# Patient Record
Sex: FEMALE | Race: WHITE | Employment: OTHER | URBAN - METROPOLITAN AREA
[De-identification: names, ages, dates, MRNs, and addresses within clinical notes are randomized per-mention and may not be internally consistent; named-entity substitution may affect disease eponyms.]

---

## 2017-11-01 ENCOUNTER — OFFICE VISIT (OUTPATIENT)
Dept: FAMILY MEDICINE CLINIC | Age: 67
End: 2017-11-01

## 2017-11-01 VITALS
BODY MASS INDEX: 31.83 KG/M2 | RESPIRATION RATE: 16 BRPM | WEIGHT: 173 LBS | OXYGEN SATURATION: 99 % | HEIGHT: 62 IN | SYSTOLIC BLOOD PRESSURE: 176 MMHG | TEMPERATURE: 97.3 F | HEART RATE: 75 BPM | DIASTOLIC BLOOD PRESSURE: 97 MMHG

## 2017-11-01 DIAGNOSIS — Z28.21 INFLUENZA VACCINE REFUSED: ICD-10-CM

## 2017-11-01 DIAGNOSIS — L91.8 CUTANEOUS SKIN TAGS: Primary | ICD-10-CM

## 2017-11-01 DIAGNOSIS — R03.0 ELEVATED BLOOD PRESSURE READING: ICD-10-CM

## 2017-11-01 NOTE — PROGRESS NOTES
Subjective:   Serena Herring is an 74 y.o. female with seasonal allergic rhinitis who presents for evaluation of skin tags and possible removal of the skin tags. Skin tags under the right breast for many years. Recently more worrisome due to location - the bra usually irritated  the skin tags and 2 days ago on the skin tags \" was scraped and turned black after\". No pain. No bleeding from the area. No growth of the skin tag. No fever. Allergies - reviewed: Allergies   Allergen Reactions    Amoxicillin Hives    Erythromycin Rash    Pcn [Penicillins] Rash     States has allergy to Amoxicillin     Sulfa (Sulfonamide Antibiotics) Hives         Medications - reviewed:  Current Outpatient Prescriptions   Medication Sig    diphenhydrAMINE (BENADRYL) 25 mg capsule Take 25 mg by mouth every six (6) hours as needed. 12.5 mg     chlorpheniramine-HYDROcodone (TUSSIONEX PENNKINETIC ER) 8-10 mg/5 mL suspension Take 5 mL by mouth every twelve (12) hours as needed for Cough.  albuterol (PROAIR HFA) 90 mcg/actuation inhaler Take 2 puffs by inhalation every four (4) hours as needed for Wheezing. No current facility-administered medications for this visit. Past Medical History - reviewed:  Past Medical History:   Diagnosis Date    Seasonal allergic rhinitis 5/3/2010         Past Surgical History - reviewed:  Past Surgical History:   Procedure Laterality Date    HX TONSILLECTOMY  13yo    HX TUBAL LIGATION           Family History - reviewed:  Family History   Problem Relation Age of Onset    Heart Disease Mother       71yo    Heart Attack Father       52yo    Breast Cancer Maternal Aunt          Social History - reviewed:  Social History     Social History    Marital status:      Spouse name: N/A    Number of children: N/A    Years of education: N/A     Occupational History    Not on file.      Social History Main Topics    Smoking status: Never Smoker    Smokeless tobacco: Not on file    Alcohol use No    Drug use: No    Sexual activity: Not on file     Other Topics Concern    Not on file     Social History Narrative    No narrative on file         Review of Systems   CONSTITUTIONAL: denies fever. Denies chills. MUSCULOSKELETAL: denies joint pain. SKIN: + Skin tags. No rash. Objective:     Visit Vitals    BP (!) 176/97    Pulse 75    Temp 97.3 °F (36.3 °C) (Oral)    Resp 16    Ht 5' 2\" (1.575 m)    Wt 173 lb (78.5 kg)    SpO2 99%    BMI 31.64 kg/m2       General appearance - alert, well appearing, and in no distress  Chest - clear to auscultation, no wheezes, rales or rhonchi, symmetric air entry  Skin - 2 pedunculated lesions( outgrowth) on the narrow stalk  on the right side of the chest under the breast, 1 of the lesions with dark discoloration. ( Photo saved in the media)    No notes on file  [facility]  OFFICE PROCEDURE PROGRESS NOTE        Chart reviewed for the following:   Karn Klinefelter, MD, have reviewed the History, Physical and updated the Allergic reactions for iContact performed immediately prior to start of procedure:   Karn Klinefelter, MD, have performed the following reviews on Carolyn Kwan prior to the start of the procedure:            * Patient was identified by name and date of birth   * Agreement on procedure being performed was verified  * Risks and Benefits explained to the patient  * Procedure site verified and marked as necessary  * Patient was positioned for comfort  * Consent was signed and verified    Skin tags are snipped off  using the liquid nitrogen and sterile iris scissors. Local anesthesia was not not used. Silver nitite sticks were applied to the area to achieve hemostasis. Thin layer of Bacitracin was applied and area was covered with the dressing. These pathognomonic lesions are not sent for pathology.          Time: 12.05 pm      Date of procedure: 11/1/2017    Procedure performed by:  Hanh Hughes MD    Provider assisted by: Te Godoy MD    Patient assisted by: self    How tolerated by patient: tolerated the procedure well with no complications    Post Procedural Pain Scale: 0 - No Hurt    Comments: none      Assessment:   75 yo female who presents for the skin tags removal.       ICD-10-CM ICD-9-CM    1. Cutaneous skin tags L91.8 701.9 REMOVAL OF SKIN TAGS   2. Influenza vaccine refused Z28.21 V64.06    3. Elevated blood pressure reading R03.0 796.2            Plan:   1. Cutaneous skin tags. Removed as above, instructions were provided. .  2. Elevated blood pressure reading. Asymptomatic. The pt left the clinic before the BP was rechecked. Was advised to come in 2 weeks for BP recheck and CPE. The pt refused Influenza vaccine, counseling provided. Follow-up Disposition:  Return in about 2 weeks (around 11/15/2017), or CPE and BP check. I have discussed the diagnosis with the patient and the intended plan as seen in the above orders. The patient has received an after-visit summary and questions were answered concerning future plans. I have discussed medication side effects and warnings with the patient as well. Informed pt to return to the office if new symptoms arise.       Hanh Hughes MD  Family Medicine Resident,PGY-2

## 2017-11-01 NOTE — PATIENT INSTRUCTIONS
Skin Tag Removal: Care Instructions  Your Care Instructions  Skin tags are small lumps of fleshy brown, tan, or pink skin. They are usually raised or hang from the skin on a small stalk. They often grow on the eyelids, neck, armpit, and groin. Skin tags are not moles and usually do not turn into cancer. You are more likely to get skin tags if you are overweight. They also tend to run in families. Skin tags may be removed if they bother you. Your doctor can remove an unwanted skin tag by simply cutting it off. However, new skin tags often form. Follow-up care is a key part of your treatment and safety. Be sure to make and go to all appointments, and call your doctor if you are having problems. It's also a good idea to know your test results and keep a list of the medicines you take. How can you care for yourself at home? · If clothing irritates a skin tag, cover it with a bandage to prevent rubbing and bleeding. · If you have a skin tag removed, clean the area with soap and water two times a day unless your doctor gives you different instructions. Don't use hydrogen peroxide or alcohol, which can slow healing. ¨ You may cover the wound with a thin layer of petroleum jelly, such as Vaseline, and a nonstick bandage. · Check all the skin on your body once a month for skin growths or other changes, such as color and feel of the skin. ¨  front of a full-length mirror. Look carefully at the front and back of your body. Then look at your right and left sides with your arms raised. JESSE [facility] your elbows and look carefully at your forearms, the back of your upper arms, and your palms. ¨ Look at your feet, the soles of your feet, and the spaces between your toes. ¨ Use a hand mirror to look at the back of your legs, the back of your neck, and your back, rear end (buttocks), and genital area. Part the hair on your head to look at your scalp. · If you see a change in a skin growth, contact your doctor.  Look for:  ¨ A mole that bleeds. ¨ A fast-growing mole. ¨ A scaly or crusted growth on the skin. ¨ A sore that will not heal.  When should you call for help? Call your doctor now or seek immediate medical care if:  ? · You have signs of infection such as:  ¨ Pain, warmth, or swelling in your skin. ¨ Red streaks near a wound in your skin. ¨ Pus coming from a wound in your skin. ¨ A fever. ? Watch closely for changes in your health, and be sure to contact your doctor if:  ? · You have an area of normal skin that suddenly changes in shape, size, or how it looks. ? · You do not get better as expected. Where can you learn more? Go to http://nerissa-cholo.info/. Enter [phone] in the search box to learn more about \"Skin Tag Removal: Care Instructions. \"  Current as of: October 13, 2016  Content Version: 11.4  © 4507-[address]. Care instructions adapted under license by [employer] (which disclaims liability or warranty for this information). If you have questions about a medical condition or this instruction, always ask your healthcare professional. [place] [address] any warranty or liability for your use of this information.

## 2017-11-01 NOTE — MR AVS SNAPSHOT
Visit Information Date & Time Provider Department Dept. Phone Encounter #  
 11/1/2017 11:05 AM Taylor Arce MD Alexis Santos [phone] [id number] Follow-up Instructions Return if symptoms worsen or fail to improve. Upcoming Health Maintenance Date Due Hepatitis C Screening 1950 DTaP/Tdap/Td series (1 - Tdap) 9/26/1973 FOBT Q 1 YEAR AGE 50-75 9/26/2002 BREAST CANCER SCRN MAMMOGRAM 5/7/2012 ZOSTER VACCINE AGE 60> 7/26/2012 INFLUENZA AGE 9 TO ADULT 8/1/2017 GLAUCOMA SCREENING Q2Y 9/26/2017 OSTEOPOROSIS SCREENING (DEXA) 9/26/2017 Pneumococcal 65+ Low/Medium Risk (1 of 2 - PCV13) 9/26/2017 MEDICARE YEARLY EXAM 9/26/2017 Allergies as of 11/1/2017  Review Complete On: 11/1/2017 By: Taylor Arce MD  
  
 Severity Noted Reaction Type Reactions Amoxicillin  11/01/2017    Hives Erythromycin  05/03/2010    Rash Pcn [Penicillins]  05/03/2010    Rash States has allergy to Amoxicillin   
 Sulfa (Sulfonamide Antibiotics)  05/03/2010    Hives Current Immunizations  Never Reviewed No immunizations on file. Not reviewed this visit You Were Diagnosed With   
  
 Codes Comments Cutaneous skin tags    -  Primary ICD-10-CM: L91.8 ICD-9-CM: 701.9 Influenza vaccine refused     ICD-10-CM: Z28.21 ICD-9-CM: V64.06 Vitals BP Pulse Temp Resp Height(growth percentile) Weight(growth percentile) (!) 176/97 75 97.3 °F (36.3 °C) (Oral) 16 5' 2\" (1.575 m) 173 lb (78.5 kg) SpO2 BMI OB Status 99% 31.64 kg/m2 Postmenopausal     
  
BMI and BSA Data Body Mass Index Body Surface Area  
 31.64 kg/m 2 1.85 m 2 Preferred Pharmacy Pharmacy Name Phone [facility] [address] [place] [address] 79 [phone] Your Updated Medication List  
  
   
This list is accurate as of: 11/1/17 12:00 PM.  Always use your most recent med list.  
  
  
  
 albuterol 90 mcg/actuation inhaler Commonly known as:  PROAIR HFA Take 2 puffs by inhalation every four (4) hours as needed for Wheezing. BENADRYL 25 mg capsule Generic drug:  diphenhydrAMINE Take 25 mg by mouth every six (6) hours as needed. 12.5 mg  
  
 chlorpheniramine-HYDROcodone 10-8 mg/5 mL suspension Commonly known as:  Vaishali Godinez ER Take 5 mL by mouth every twelve (12) hours as needed for Cough. We Performed the Following REMOVAL OF SKIN TAGS [85806 CPT(R)] Follow-up Instructions Return if symptoms worsen or fail to improve. Patient Instructions Skin Tag Removal: Care Instructions Your Care Instructions Skin tags are small lumps of fleshy brown, tan, or pink skin. They are usually raised or hang from the skin on a small stalk. They often grow on the eyelids, neck, armpit, and groin. Skin tags are not moles and usually do not turn into cancer. You are more likely to get skin tags if you are overweight. They also tend to run in families. Skin tags may be removed if they bother you. Your doctor can remove an unwanted skin tag by simply cutting it off. However, new skin tags often form. Follow-up care is a key part of your treatment and safety. Be sure to make and go to all appointments, and call your doctor if you are having problems. It's also a good idea to know your test results and keep a list of the medicines you take. How can you care for yourself at home? · If clothing irritates a skin tag, cover it with a bandage to prevent rubbing and bleeding. · If you have a skin tag removed, clean the area with soap and water two times a day unless your doctor gives you different instructions. Don't use hydrogen peroxide or alcohol, which can slow healing. ¨ You may cover the wound with a thin layer of petroleum jelly, such as Vaseline, and a nonstick bandage.  
· Check all the skin on your body once a month for skin growths or other changes, such as color and feel of the skin. ¨  front of a full-length mirror. Look carefully at the front and back of your body. Then look at your right and left sides with your arms raised. JESSE [facility] your elbows and look carefully at your forearms, the back of your upper arms, and your palms. ¨ Look at your feet, the soles of your feet, and the spaces between your toes. ¨ Use a hand mirror to look at the back of your legs, the back of your neck, and your back, rear end (buttocks), and genital area. Part the hair on your head to look at your scalp. · If you see a change in a skin growth, contact your doctor. Look for: ¨ A mole that bleeds. ¨ A fast-growing mole. ¨ A scaly or crusted growth on the skin. ¨ A sore that will not heal. 
When should you call for help? Call your doctor now or seek immediate medical care if: 
? · You have signs of infection such as: 
¨ Pain, warmth, or swelling in your skin. ¨ Red streaks near a wound in your skin. ¨ Pus coming from a wound in your skin. ¨ A fever. ? Watch closely for changes in your health, and be sure to contact your doctor if: 
? · You have an area of normal skin that suddenly changes in shape, size, or how it looks. ? · You do not get better as expected. Where can you learn more? Go to http://nerissa-cholo.info/. Enter ([phone] in the search box to learn more about \"Skin Tag Removal: Care Instructions. \" Current as of: October 13, 2016 Content Version: 11.4 © 5682-0621 [email or web address]. Care instructions adapted under license by [employer] (which disclaims liability or warranty for this information). If you have questions about a medical condition or this instruction, always ask your healthcare professional. [place] [address] any warranty or liability for your use of this information. Introducing [facility]!    
 [employer] introduces [employer] patient portal. Now you can access parts of your medical record, email your doctor's office, and request medication refills online. 1. In your internet browser, go to [email or web address] 2. Click on the First Time User? Click Here link in the Sign In box. You will see the New Member Sign Up page. 3. Enter your Voltaire Access Code exactly as it appears below. You will not need to use this code after youve completed the sign-up process. If you do not sign up before the expiration date, you must request a new code. · Voltaire Access Code: [email or web address] Expires: 1/30/2018 11:06 AM 
 
4. Enter the last four digits of your Social Security Number (xxxx) and Date of Birth (mm/dd/yyyy) as indicated and click Submit. You will be taken to the next sign-up page. 5. Create a Voltaire ID. This will be your Voltaire login ID and cannot be changed, so think of one that is secure and easy to remember. 6. Create a Voltaire password. You can change your password at any time. 7. Enter your Password Reset Question and Answer. This can be used at a later time if you forget your password. 8. Enter your e-mail address. You will receive e-mail notification when new information is available in [address] 9. Click Sign Up. You can now view and download portions of your medical record. 10. Click the Download Summary menu link to download a portable copy of your medical information. If you have questions, please visit the Frequently Asked Questions section of the Voltaire website. Remember, Voltaire is NOT to be used for urgent needs. For medical emergencies, dial 911. Now available from your iPhone and Android! Please provide this summary of care documentation to your next provider. Your primary care clinician is listed as Deborah Blackwood. If you have any questions after today's visit, please call [phone].

## 2017-11-01 NOTE — PROGRESS NOTES
Chief Complaint   Patient presents with    Skin Problem     skin tags under right breast sore and turned black     1. Have you been to the ER, urgent care clinic since your last visit? Hospitalized since your last visit? No    2. Have you seen or consulted any other health care providers outside of the [address] [place] [address] since your last visit? Include any pap smears or colon screening.  No    [facility]  OFFICE PROCEDURE PROGRESS NOTE        Chart reviewed for the following:   Leticia SHAIKH LPN, have reviewed the History, Physical and updated the Allergic reactions for [id number] Luchadores performed immediately prior to start of procedure:   Tisha Howe LPN, have performed the following reviews on Anni Patel prior to the start of the procedure:            * Patient was identified by name and date of birth   * Agreement on procedure being performed was verified  * Risks and Benefits explained to the patient  * Procedure site verified and marked as necessary  * Patient was positioned for comfort  * Consent was signed and verified     Time: 11:40am      Date of procedure: 11/1/2017    Procedure performed by:  Louise Allen MD    Provider assisted by: Dr. Elroy Hartman     Patient assisted by: nursing attendant    How tolerated by patient: tolerated the procedure well with no complications    Post Procedural Pain Scale: 0 - No Hurt    Comments: none

## 2017-11-26 NOTE — PROGRESS NOTES
I saw and evaluated the patient, performing the key elements of the service. I discussed the findings, assessment and plan with the resident and agree with the resident's findings and plan as documented in the resident's note. I personally supervised the procedure. No complications. Bleeding well controlled with silver nitrate.

## 2018-07-12 ENCOUNTER — OFFICE VISIT (OUTPATIENT)
Dept: FAMILY MEDICINE CLINIC | Age: 68
End: 2018-07-12

## 2018-07-12 ENCOUNTER — HOSPITAL ENCOUNTER (OUTPATIENT)
Dept: LAB | Age: 68
Discharge: HOME | End: 2018-07-12
Payer: MEDICARE

## 2018-07-12 VITALS
RESPIRATION RATE: 16 BRPM | HEART RATE: 57 BPM | BODY MASS INDEX: 30.29 KG/M2 | TEMPERATURE: 97.6 F | DIASTOLIC BLOOD PRESSURE: 71 MMHG | SYSTOLIC BLOOD PRESSURE: 125 MMHG | HEIGHT: 62 IN | OXYGEN SATURATION: 100 % | WEIGHT: 164.6 LBS

## 2018-07-12 DIAGNOSIS — Z11.59 NEED FOR HEPATITIS C SCREENING TEST: ICD-10-CM

## 2018-07-12 DIAGNOSIS — Z00.00 WELCOME TO MEDICARE PREVENTIVE VISIT: Primary | ICD-10-CM

## 2018-07-12 DIAGNOSIS — Z12.11 SCREEN FOR COLON CANCER: ICD-10-CM

## 2018-07-12 DIAGNOSIS — Z13.39 SCREENING FOR ALCOHOLISM: ICD-10-CM

## 2018-07-12 DIAGNOSIS — Z13.1 SCREENING FOR DIABETES MELLITUS: ICD-10-CM

## 2018-07-12 DIAGNOSIS — Z13.820 OSTEOPOROSIS SCREENING: ICD-10-CM

## 2018-07-12 DIAGNOSIS — Z13.31 SCREENING FOR DEPRESSION: ICD-10-CM

## 2018-07-12 DIAGNOSIS — Z23 ENCOUNTER FOR IMMUNIZATION: ICD-10-CM

## 2018-07-12 DIAGNOSIS — Z12.31 ENCOUNTER FOR SCREENING MAMMOGRAM FOR MALIGNANT NEOPLASM OF BREAST: ICD-10-CM

## 2018-07-12 PROCEDURE — 36415 COLL VENOUS BLD VENIPUNCTURE: CPT

## 2018-07-12 PROCEDURE — 86803 HEPATITIS C AB TEST: CPT

## 2018-07-12 PROCEDURE — 80061 LIPID PANEL: CPT

## 2018-07-12 PROCEDURE — 85027 COMPLETE CBC AUTOMATED: CPT

## 2018-07-12 PROCEDURE — 80053 COMPREHEN METABOLIC PANEL: CPT

## 2018-07-12 NOTE — PROGRESS NOTES
Identified Patient with two Patient identifiers (Name and ). Two Patient Identifiers confirmed. Reviewed record in preparation for visit and have obtained necessary documentation. Chief Complaint   Patient presents with    Annual Wellness Visit     Fasting       Visit Vitals    /71 (BP 1 Location: Right arm, BP Patient Position: Sitting)    Pulse (!) 57    Temp 97.6 °F (36.4 °C) (Oral)    Resp 16    Ht 5' 2\" (1.575 m)    Wt 164 lb 9.6 oz (74.7 kg)    SpO2 100%    BMI 30.11 kg/m2       1. Have you been to the ER, urgent care clinic since your last visit? Hospitalized since your last visit? No    2. Have you seen or consulted any other health care providers outside of the [address] [place] [address] since your last visit? Include any pap smears or colon screening. No    PHQ over the last two weeks 2018   Little interest or pleasure in doing things Not at all   Feeling down, depressed or hopeless Not at all   Total Score PHQ 2 0     Fall Risk Assessment, last 12 mths 2018   Able to walk? Yes   Fall in past 12 months? No       General Health Questions   -During the past 4 weeks:   -how would you rate your health in general? Good   -how often have you been bothered by feeling dizzy when standing up? never   -how much have you been bothered by bodily pain? not   -Have you noticed any hearing difficulties? no   -has your physical and emotional health limited your social activities with family or friends? no    Emotional Health Questions   -Do you have a history of depression, anxiety, or emotional problems? no  -Over the past 2 weeks, have you felt down, depressed or hopeless? no  -Over the past 2 weeks, have you felt little interest or pleasure in doing things? no    Health Habits   Please describe your diet habits: Breakfast/Lunch , Only 2 regular meals and 1 light meal   Do you get 5 servings of fruits or vegetables daily? yes  Do you exercise regularly?  yes    Activities of Daily Living and Functional Status   -Do you need help with eating, walking, dressing, bathing, toileting, the phone, transportation, shopping, preparing meals, housework, laundry, medications or managing money? no  -In the past four weeks, was someone available to help you if you needed and wanted help with anything? yes  -Are you confident are you that you can control and manage most of your health problems? yes  -Have you been given information to help you keep track of your medications? N/A Not Currently taking any  medications at this time  -How often do you have trouble taking your medications as prescribed? N/A    Fall Risk and Home Safety   Have you fallen 2 or more times in the past year? no  Does your home have rugs in the hallway? No  Do you have grab bars in the bathroom? No  Do you have handrails on the stairs? Yes  Do you have adequate lighting? Yes  Do you have smoke detectors and check them regularly?  yes  Do you have difficulties driving a car? no  Do you always fasten your seat belt when you are in a car? yes

## 2018-07-12 NOTE — PATIENT INSTRUCTIONS
Medicare Wellness Visit, Female    The best way to live healthy is to have a lifestyle where you eat a well-balanced diet, exercise regularly, limit alcohol use, and quit all forms of tobacco/nicotine, if applicable. Regular preventive services are another way to keep healthy. Preventive services (vaccines, screening tests, monitoring & exams) can help personalize your care plan, which helps you manage your own care. Screening tests can find health problems at the earliest stages, when they are easiest to treat. [address] [facility] follows the current, evidence-based guidelines published by the [facility] Patrice Zac ([facility]) when recommending preventive services for our patients. Because we follow these guidelines, sometimes recommendations change over time as research supports it. (For example, mammograms used to be recommended annually. Even though Medicare will still pay for an annual mammogram, the newer guidelines recommend a mammogram every two years for women of average risk.)    Of course, you and your provider may decide to screen more often for some diseases, based on your risk and co-morbidities (chronic disease you are already diagnosed with). Preventive services for you include:    - Medicare offers their members a free annual wellness visit, which is time for you and your primary care provider to discuss and plan for your preventive service needs. Take advantage of this benefit every year!    -All people over age 72 should receive the recommended pneumonia vaccines. Current USPSTF guidelines recommend a series of two vaccines for the best pneumonia protection.     -All adults should have a yearly flu vaccine and a tetanus vaccine every 10 years. All adults age 61 years should receive a shingles vaccine once in their lifetime.      -A bone mass density test is recommended when a woman turns 65 to screen for osteoporosis.  This test is only recommended once as a screening. Some providers will use this same test as a disease monitoring tool if you already have osteoporosis. -All adults age 38-68 years who are overweight should have a diabetes screening test once every three years.     -Other screening tests & preventive services for persons with diabetes include: an eye exam to screen for diabetic retinopathy, a kidney function test, a foot exam, and stricter control over your cholesterol.     -Cardiovascular screening for adults with routine risk involves an electrocardiogram (ECG) at intervals determined by the provider.     -Colorectal cancer screenings should be done for adults age 54-65 years with normal risk. There are a number of acceptable methods of screening for this type of cancer. Each test has its own benefits and drawbacks. Discuss with your provider what is most appropriate for you during your annual wellness visit. The different tests include: colonoscopy (considered the best screening method), a fecal occult blood test, a fecal DNA test, and sigmoidoscopy. -Breast cancer screenings are recommended every other year for women of normal risk age 54-69 years.     -Cervical cancer screenings for women over age 72 are only recommended with certain risk factors.     -All adults born between [facility] should be screened once for Hepatitis C.      Here is a list of your current Health Maintenance items (your personalized list of preventive services) with a due date:  Health Maintenance Due   Topic Date Due    Hepatitis C Test  1950    DTaP/Tdap/Td  (1 - Tdap) 09/26/1973    Stool testing for trace blood  09/26/2002    Breast Cancer Screening  05/07/2012    Shingles Vaccine  07/26/2012    Glaucoma Screening   09/26/2017    Bone Mineral Density   09/26/2017    Pneumococcal Vaccine (1 of 2 - PCV13) 09/26/2017    Annual Well Visit  03/14/2018

## 2018-07-12 NOTE — PROGRESS NOTES
This is a \"Welcome to [facility]"  Initial Preventive Physical Examination (IPPE) providing Personalized Prevention Plan Services (Performed in the first 12 months of enrollment)  I have reviewed the patient's medical history in detail and updated the computerized patient record. Mrs. Ya Ortzi is a pleasant older female with no significant PMH who presents to clinic today for her first medicare visit. She has no acute complaints or concerns today. States she has not had a wellness visit since  as she has been healthy throughout that time. Currently, her only medications include a daily children's benadryl for seasonal allergy symptoms. A full review of symptoms is performed ans available in ROS section of this note. History     Past Medical History:   Diagnosis Date    Seasonal allergic rhinitis 5/3/2010      Past Surgical History:   Procedure Laterality Date    HX TONSILLECTOMY  13yo    HX TUBAL LIGATION       Current Outpatient Prescriptions   Medication Sig Dispense Refill    diphenhydrAMINE (BENADRYL) 25 mg capsule Take 25 mg by mouth every six (6) hours as needed. 12.5 mg       chlorpheniramine-HYDROcodone (TUSSIONEX PENNKINETIC ER) 8-10 mg/5 mL suspension Take 5 mL by mouth every twelve (12) hours as needed for Cough. 60 mL 0    albuterol (PROAIR HFA) 90 mcg/actuation inhaler Take 2 puffs by inhalation every four (4) hours as needed for Wheezing.  1 Inhaler 0     Allergies   Allergen Reactions    Amoxicillin Hives    Erythromycin Rash    Pcn [Penicillins] Rash     States has allergy to Amoxicillin     Sulfa (Sulfonamide Antibiotics) Hives     Family History   Problem Relation Age of Onset    Heart Disease Mother       69yo    Heart Attack Father       54yo    Breast Cancer Maternal Aunt      Social History   Substance Use Topics    Smoking status: Never Smoker    Smokeless tobacco: Never Used    Alcohol use No     Diet, Lifestyle: No special diet    Exercise level: moderately active     Review of Systems   Constitutional: Negative for malaise/fatigue. HENT: Negative for hearing loss, sinus pain and sore throat. Eyes: Negative for blurred vision, double vision and photophobia. Respiratory: Negative for cough, shortness of breath and wheezing. Cardiovascular: Negative for chest pain, palpitations, orthopnea, claudication, leg swelling and PND. Gastrointestinal: Negative for blood in stool, constipation, melena, nausea and vomiting. Musculoskeletal: Negative for falls, joint pain and myalgias. Skin: Negative for itching and rash. Neurological: Negative for sensory change, focal weakness, weakness and headaches. Psychiatric/Behavioral: Negative for depression, memory loss and substance abuse. The patient does not have insomnia. Depression Risk Screen     PHQ over the last two weeks 7/12/2018   Little interest or pleasure in doing things Not at all   Feeling down, depressed or hopeless Not at all   Total Score PHQ 2 0     Alcohol Risk Screen   You do not drink alcohol or very rarely. Functional Ability and Level of Safety   Hearing Loss  Hearing is good. Vision Screening  Vision is good. No exam data present      Activities of Daily Living  The home contains: no safety equipment. Patient does total self care    Fall Risk Screen  Fall Risk Assessment, last 12 mths 7/12/2018   Able to walk? Yes   Fall in past 12 months? No       Abuse Screen  Patient is not abused    Screening EKG   EKG order placed: Yes    Patient Care Team   Patient Care Team:  Jeff Barbour MD as PCP - General (Family Practice)     End of Life Planning   Advanced care planning directives were discussed with the patient and /or family/caregiver. She was provided [facility] forms and directions for establishing advanced directives. Physical Exam   Constitutional: She is oriented to person, place, and time. AOX3, NAD   HENT:   Head: Normocephalic and atraumatic.    Right Ear: External ear normal.   Left Ear: External ear normal.   Nose: Nose normal.   Mouth/Throat: Oropharynx is clear and moist. No oropharyngeal exudate. Eyes: Conjunctivae and EOM are normal. Pupils are equal, round, and reactive to light. Right eye exhibits no discharge. Left eye exhibits no discharge. No scleral icterus. Neck: Normal range of motion. Neck supple. No JVD present. No tracheal deviation present. No thyromegaly present. Cardiovascular: Normal rate, regular rhythm, normal heart sounds and intact distal pulses. Exam reveals no gallop and no friction rub. No murmur heard. Pulmonary/Chest: Effort normal and breath sounds normal. No respiratory distress. She has no wheezes. She exhibits no tenderness. Abdominal: Soft. Bowel sounds are normal. She exhibits no distension. There is no tenderness. Musculoskeletal: Normal range of motion. She exhibits no edema, tenderness or deformity. Lymphadenopathy:     She has no cervical adenopathy. Neurological: She is alert and oriented to person, place, and time. She has normal reflexes. No cranial nerve deficit. Gait normal. Coordination normal.   Skin: Skin is warm and dry. No rash noted. No erythema. No pallor. Psychiatric: Affect and judgment normal.     Assessment/Plan   Education and counseling provided:  Are appropriate based on today's review and evaluation  End-of-Life planning (with patient's consent)  Pneumococcal Vaccine  Screening Mammography  Colorectal cancer screening tests  Cardiovascular screening blood test  Bone mass measurement (DEXA)  Screening for glaucoma  Diabetes screening test    Diagnoses and all orders for this visit:    1. Welcome to Medicare preventive visit  -    Immunizations: Discussed need for Tdap, Pneumococcal and Zoster vaccine. Pt declined vaccine. Discussed risks of recurrent disease after 65 in absence of immunization. She expresses understanding and signed refusal form.   -     Pt also with Family Hx of heart disease (father  is 46s from rheumatic heart disease and mother in 76s from atherosclerosis). Cardiac stress test in  unremarkable. No hx of HTN or DM  -     12 Lead EKG in office today with NSR and non-specific T wave changes  -     Will obtain and follow-up CBC, CMP and Lipid Panel    2. Osteoporosis screening  -     Dexa Bone Density Study Axial ([id number]); Future    3. Screening for alcoholism  -     Annual  Alcohol Screen 15 min ()    4. Screening for depression  -     Depression Screen Annual    5. Screening for diabetes mellitus         -     Will follow-up fasting blood glucose levels    6. Need for hepatitis C screening test  -     HEPATITIS C AB    7. Encounter for screening mammogram for malignant neoplasm of breast  -     Bilateral Digital Screening Mammography; Future    8. Screen for colon cancer        -      Discussed importance of screening colonoscopy. Pt averse to colonoscopy, citing close friend who  in the process. Offered FOBT screening but pt declined this as well. Pt discussed with Dr. Madeleine Evangelista; attending Physician.     Health Maintenance Due   Topic Date Due    Hepatitis C Screening  1950    DTaP/Tdap/Td series (1 - Tdap) 1973    FOBT Q 1 YEAR AGE 50-75  2002    BREAST CANCER SCRN MAMMOGRAM  2012    ZOSTER VACCINE AGE 60>  2012    GLAUCOMA SCREENING Q2Y  2017    Bone Densitometry (Dexa) Screening  2017    Pneumococcal 65+ Low/Medium Risk (1 of 2 - PCV13) 2017    MEDICARE YEARLY EXAM  2018

## 2018-07-12 NOTE — MR AVS SNAPSHOT
[address] 
[phone] Patient: Abbi Romo MRN: [id number] MVP:7/30/[birth year]25 Visit Information Date & Time Provider Department Dept. Phone Encounter #  
 7/12/2018  8:30 AM King Carpenter MD [facility] [phone] [id number] Follow-up Instructions Return in about 6 months (around 1/12/2019), or if symptoms worsen or fail to improve. Upcoming Health Maintenance Date Due Hepatitis C Screening 1950 DTaP/Tdap/Td series (1 - Tdap) 9/26/1973 FOBT Q 1 YEAR AGE 50-75 9/26/2002 BREAST CANCER SCRN MAMMOGRAM 5/7/2012 ZOSTER VACCINE AGE 60> 7/26/2012 GLAUCOMA SCREENING Q2Y 9/26/2017 Bone Densitometry (Dexa) Screening 9/26/2017 Pneumococcal 65+ Low/Medium Risk (1 of 2 - PCV13) 9/26/2017 MEDICARE YEARLY EXAM 3/14/2018 Influenza Age 5 to Adult 8/1/2018 Allergies as of 7/12/2018  Review Complete On: 7/12/2018 By: Knig Carpenter MD  
  
 Severity Noted Reaction Type Reactions Amoxicillin  11/01/2017    Hives Erythromycin  05/03/2010    Rash Pcn [Penicillins]  05/03/2010    Rash States has allergy to Amoxicillin   
 Sulfa (Sulfonamide Antibiotics)  05/03/2010    Hives Current Immunizations  Never Reviewed No immunizations on file. Not reviewed this visit You Were Diagnosed With   
  
 Codes Comments Welcome to Medicare preventive visit    -  Primary ICD-10-CM: Z00.00 ICD-9-CM: V70.0 Osteoporosis screening     ICD-10-CM: Z13.820 ICD-9-CM: V82.81 Screening for alcoholism     ICD-10-CM: Z13.89 ICD-9-CM: V79.1 Screening for depression     ICD-10-CM: Z13.89 ICD-9-CM: V79.0 Screening for diabetes mellitus     ICD-10-CM: Z13.1 ICD-9-CM: V77.1 Need for hepatitis C screening test     ICD-10-CM: Z11.59 
ICD-9-CM: V73.89  Encounter for screening mammogram for malignant neoplasm of breast ICD-10-CM: Z12.31 
ICD-9-CM: V76.12 Screen for colon cancer     ICD-10-CM: Z12.11 ICD-9-CM: V76.51 Encounter for immunization     ICD-10-CM: Q49 ICD-9-CM: V03.89 Vitals BP Pulse Temp Resp Height(growth percentile) Weight(growth percentile) 125/71 (BP 1 Location: Right arm, BP Patient Position: Sitting) (!) 57 97.6 °F (36.4 °C) (Oral) 16 5' 2\" (1.575 m) 164 lb 9.6 oz (74.7 kg) SpO2 BMI OB Status Smoking Status 100% 30.11 kg/m2 Postmenopausal Never Smoker Vitals History BMI and BSA Data Body Mass Index Body Surface Area  
 30.11 kg/m 2 1.81 m 2 Preferred Pharmacy Pharmacy Name Phone [address] [address] [phone] Your Updated Medication List  
  
   
This list is accurate as of 7/12/18  9:44 AM.  Always use your most recent med list.  
  
  
  
  
 albuterol 90 mcg/actuation inhaler Commonly known as:  PROAIR HFA Take 2 puffs by inhalation every four (4) hours as needed for Wheezing. BENADRYL 25 mg capsule Generic drug:  diphenhydrAMINE Take 25 mg by mouth every six (6) hours as needed. 12.5 mg  
  
 chlorpheniramine-HYDROcodone 10-8 mg/5 mL suspension Commonly known as:  Nichol Grieves ER Take 5 mL by mouth every twelve (12) hours as needed for Cough. We Performed the Following AMB POC EKG ROUTINE W/ 12 LEADS, INTER & REP [51117 CPT(R)] CBC W/O DIFF [08299 CPT(R)] [address] [[id number] [facility]] HEPATITIS C AB [92936 CPT(R)] LIPID PANEL [16829 CPT(R)] METABOLIC PANEL, COMPREHENSIVE [96859 CPT(R)] [place] ANNUAL ALCOHOL SCREEN 15 MIN [id number] [facility]] Follow-up Instructions Return in about 6 months (around 1/12/2019), or if symptoms worsen or fail to improve. To-Do List   
 07/15/2018 Imaging:  DEXA BONE DENSITY STUDY AXIAL   
  
 07/15/2018   Imaging:  GEO MAMMO BI SCREENING INCL CAD   
  
  
 Patient Instructions Medicare Wellness Visit, Female The best way to live healthy is to have a lifestyle where you eat a well-balanced diet, exercise regularly, limit alcohol use, and quit all forms of tobacco/nicotine, if applicable. Regular preventive services are another way to keep healthy. Preventive services (vaccines, screening tests, monitoring & exams) can help personalize your care plan, which helps you manage your own care. Screening tests can find health problems at the earliest stages, when they are easiest to treat. [address] [facility] follows the current, evidence-based guidelines published by the [facility] Patrice Frank ([facility]) when recommending preventive services for our patients. Because we follow these guidelines, sometimes recommendations change over time as research supports it. (For example, mammograms used to be recommended annually. Even though Medicare will still pay for an annual mammogram, the newer guidelines recommend a mammogram every two years for women of average risk.) Of course, you and your provider may decide to screen more often for some diseases, based on your risk and co-morbidities (chronic disease you are already diagnosed with). Preventive services for you include: - Medicare offers their members a free annual wellness visit, which is time for you and your primary care provider to discuss and plan for your preventive service needs. Take advantage of this benefit every year! 
 
-All people over age 72 should receive the recommended pneumonia vaccines. Current USPSTF guidelines recommend a series of two vaccines for the best pneumonia protection.  
 
-All adults should have a yearly flu vaccine and a tetanus vaccine every 10 years.  All adults age 61 years should receive a shingles vaccine once in their lifetime.   
 
-A bone mass density test is recommended when a woman turns 65 to screen for osteoporosis. This test is only recommended once as a screening. Some providers will use this same test as a disease monitoring tool if you already have osteoporosis. -All adults age 38-68 years who are overweight should have a diabetes screening test once every three years.  
 
-Other screening tests & preventive services for persons with diabetes include: an eye exam to screen for diabetic retinopathy, a kidney function test, a foot exam, and stricter control over your cholesterol.  
 
-Cardiovascular screening for adults with routine risk involves an electrocardiogram (ECG) at intervals determined by the provider.  
 
-Colorectal cancer screenings should be done for adults age 54-65 years with normal risk. There are a number of acceptable methods of screening for this type of cancer. Each test has its own benefits and drawbacks. Discuss with your provider what is most appropriate for you during your annual wellness visit. The different tests include: colonoscopy (considered the best screening method), a fecal occult blood test, a fecal DNA test, and sigmoidoscopy. -Breast cancer screenings are recommended every other year for women of normal risk age 54-69 years.  
 
-Cervical cancer screenings for women over age 72 are only recommended with certain risk factors.  
 
-All adults born between [facility] should be screened once for Hepatitis C. Here is a list of your current Health Maintenance items (your personalized list of preventive services) with a due date: 
Health Maintenance Due Topic Date Due  
 Hepatitis C Test  1950  DTaP/Tdap/Td  (1 - Tdap) 09/26/1973  Stool testing for trace blood  09/26/2002  Breast Cancer Screening  05/07/2012  Shingles Vaccine  07/26/2012  Glaucoma Screening   09/26/2017  Bone Mineral Density   09/26/2017  Pneumococcal Vaccine (1 of 2 - PCV13) 09/26/2017 Mario Reynoso Annual Well Visit  03/14/2018 [facility] [place] [facility]! Alan Barksdale introduces [employer] patient portal. Now you can access parts of your medical record, email your doctor's office, and request medication refills online. 1. In your internet browser, go to [email or web address] 2. Click on the First Time User? Click Here link in the Sign In box. You will see the New Member Sign Up page. 3. Enter your [employer] Access Code exactly as it appears below. You will not need to use this code after youve completed the sign-up process. If you do not sign up before the expiration date, you must request a new code. · [employer] Access Code: [email or web address] Expires: 10/10/2018  8:33 AM 
 
4. Enter the last four digits of your Social Security Number (xxxx) and Date of Birth (mm/dd/yyyy) as indicated and click Submit. You will be taken to the next sign-up page. 5. Create a [employer] ID. This will be your [employer] login ID and cannot be changed, so think of one that is secure and easy to remember. 6. Create a [employer] password. You can change your password at any time. 7. Enter your Password Reset Question and Answer. This can be used at a later time if you forget your password. 8. Enter your e-mail address. You will receive e-mail notification when new information is available in [address] 9. Click Sign Up. You can now view and download portions of your medical record. 10. Click the Download Summary menu link to download a portable copy of your medical information. If you have questions, please visit the Frequently Asked Questions section of the [employer] website. Remember, [employer] is NOT to be used for urgent needs. For medical emergencies, dial 911. Now available from your iPhone and Android! Please provide this summary of care documentation to your next provider. Your primary care clinician is listed as Janneth Woods. If you have any questions after today's visit, please call [phone].

## 2018-07-13 LAB
ALBUMIN SERPL-MCNC: 4.5 G/DL (ref 3.6–4.8)
ALBUMIN/GLOB SERPL: 1.8 {RATIO} (ref 1.2–2.2)
ALP SERPL-CCNC: 66 IU/L (ref 39–117)
ALT SERPL-CCNC: 20 IU/L (ref 0–32)
AST SERPL-CCNC: 21 IU/L (ref 0–40)
BILIRUB SERPL-MCNC: 0.6 MG/DL (ref 0–1.2)
BUN SERPL-MCNC: 13 MG/DL (ref 8–27)
BUN/CREAT SERPL: 16 (ref 12–28)
CALCIUM SERPL-MCNC: 9.8 MG/DL (ref 8.7–10.3)
CHLORIDE SERPL-SCNC: 103 MMOL/L (ref 96–106)
CHOLEST SERPL-MCNC: 205 MG/DL (ref 100–199)
CO2 SERPL-SCNC: 23 MMOL/L (ref 20–29)
CREAT SERPL-MCNC: 0.8 MG/DL (ref 0.57–1)
ERYTHROCYTE [DISTWIDTH] IN BLOOD BY AUTOMATED COUNT: 13.3 % (ref 12.3–15.4)
GLOBULIN SER CALC-MCNC: 2.5 G/DL (ref 1.5–4.5)
GLUCOSE SERPL-MCNC: 105 MG/DL (ref 65–99)
HCT VFR BLD AUTO: 42.2 % (ref 34–46.6)
HCV AB S/CO SERPL IA: <0.1 S/CO RATIO (ref 0–0.9)
HDLC SERPL-MCNC: 74 MG/DL
HGB BLD-MCNC: 14 G/DL (ref 11.1–15.9)
INTERPRETATION, 910389: NORMAL
LDLC SERPL CALC-MCNC: 109 MG/DL (ref 0–99)
MCH RBC QN AUTO: 30.8 PG (ref 26.6–33)
MCHC RBC AUTO-ENTMCNC: 33.2 G/DL (ref 31.5–35.7)
MCV RBC AUTO: 93 FL (ref 79–97)
PLATELET # BLD AUTO: 229 X10E3/UL (ref 150–379)
POTASSIUM SERPL-SCNC: 4.8 MMOL/L (ref 3.5–5.2)
PROT SERPL-MCNC: 7 G/DL (ref 6–8.5)
RBC # BLD AUTO: 4.55 X10E6/UL (ref 3.77–5.28)
SODIUM SERPL-SCNC: 141 MMOL/L (ref 134–144)
TRIGL SERPL-MCNC: 111 MG/DL (ref 0–149)
VLDLC SERPL CALC-MCNC: 22 MG/DL (ref 5–40)
WBC # BLD AUTO: 8.3 X10E3/UL (ref 3.4–10.8)

## 2018-07-20 ENCOUNTER — TELEPHONE (OUTPATIENT)
Dept: FAMILY MEDICINE CLINIC | Age: 68
End: 2018-07-20

## 2018-07-20 NOTE — TELEPHONE ENCOUNTER
Karoline Barth, [phone], [facility] scheduling conceirges. She called to say the dexa scan order triggered due to  [address] To use code MA5.89 or M89.9, either will pass. Please place new order.

## 2018-07-23 ENCOUNTER — HOSPITAL ENCOUNTER (OUTPATIENT)
Dept: MAMMOGRAPHY | Age: 68
Discharge: HOME | End: 2018-07-23
Attending: STUDENT IN AN ORGANIZED HEALTH CARE EDUCATION/TRAINING PROGRAM
Payer: MEDICARE

## 2018-07-23 DIAGNOSIS — Z12.31 ENCOUNTER FOR SCREENING MAMMOGRAM FOR MALIGNANT NEOPLASM OF BREAST: ICD-10-CM

## 2018-07-23 DIAGNOSIS — M85.89 OTHER SPECIFIED DISORDERS OF BONE DENSITY AND STRUCTURE, MULTIPLE SITES: ICD-10-CM

## 2018-07-23 DIAGNOSIS — M85.89 OTHER SPECIFIED DISORDERS OF BONE DENSITY AND STRUCTURE, MULTIPLE SITES: Primary | ICD-10-CM

## 2018-07-23 PROCEDURE — 77080 DXA BONE DENSITY AXIAL: CPT

## 2018-07-23 PROCEDURE — 77067 SCR MAMMO BI INCL CAD: CPT

## 2018-07-23 NOTE — TELEPHONE ENCOUNTER
Catie Betts called for update. Informed her that I would speak with doctor as in the office this morning. He is putting in the new order now. Returned her call and left voice mail of new order done at this time.

## 2018-07-24 DIAGNOSIS — M85.80 OSTEOPENIA, UNSPECIFIED LOCATION: Primary | ICD-10-CM

## 2018-07-24 PROBLEM — M85.89 OSTEOPENIA OF MULTIPLE SITES: Status: ACTIVE | Noted: 2018-07-24

## 2018-07-24 RX ORDER — CALCIUM CARBONATE/VITAMIN D3 250-3.125
1 TABLET ORAL DAILY
Qty: 90 TAB | Refills: 2 | Status: SHIPPED | OUTPATIENT
Start: 2018-07-24

## 2018-07-24 NOTE — PROGRESS NOTES
Pt osteopenic. No indication for Bisphosphonate therapy at this time. Discussed results with Pt. Recommended pt initiate OSCAL (Ca-Vit. D).

## 2022-03-19 PROBLEM — M85.89 OSTEOPENIA OF MULTIPLE SITES: Status: ACTIVE | Noted: 2018-07-24

## 2022-08-30 ENCOUNTER — OFFICE VISIT (OUTPATIENT)
Dept: PODIATRY | Age: 72
End: 2022-08-30

## 2023-03-06 ENCOUNTER — NURSE TRIAGE (OUTPATIENT)
Dept: OTHER | Facility: CLINIC | Age: 73
End: 2023-03-06

## 2023-03-06 NOTE — TELEPHONE ENCOUNTER
Location of patient: [address] lopez from Fiona Leyva at [facility] with [employer]; Patient with Red Flag Complaint requesting to establish care with Michelle Townsend. Current Symptoms: interment right sided facial and ear pressure    Denies - facial droop, facial numbness facial swelling, nasal congestion    Onset: 2 days ago;     Pain Severity: 3/10; when pain occurs but reports no pain at present    Temperature: denies       Recommended disposition: See PCP within 24 Hours\  Patient does not have a PCP and was advised to go to an [address] [place] [address] Ave for current problem. Care advice provided, patient verbalizes understanding; denies any other questions or concerns; instructed to call back for any new or worsening symptoms. Patient/Caller agrees with recommended disposition; writer provided warm transfer to Nallely Schwarz at [facility] for appointment scheduling  For new patient appointment scheduling. Attention Provider: Thank you for allowing me to participate in the care of your patient. The patient was connected to triage in response to information provided to the [facility]. Please do not respond through this encounter as the response is not directed to a shared pool. Reason for Disposition   Face pain present > 24 hours    Protocols used:  Face Pain-ADULT-[place]

## 2023-03-09 ENCOUNTER — OFFICE VISIT (OUTPATIENT)
Dept: FAMILY MEDICINE CLINIC | Age: 73
End: 2023-03-09

## 2023-03-09 VITALS
TEMPERATURE: 98.2 F | SYSTOLIC BLOOD PRESSURE: 146 MMHG | OXYGEN SATURATION: 98 % | HEIGHT: 62 IN | WEIGHT: 182.4 LBS | DIASTOLIC BLOOD PRESSURE: 79 MMHG | HEART RATE: 88 BPM | BODY MASS INDEX: 33.57 KG/M2

## 2023-03-09 DIAGNOSIS — G50.0 RIGHT TRIGEMINAL NEURALGIA: ICD-10-CM

## 2023-03-09 DIAGNOSIS — Z76.89 ESTABLISHING CARE WITH NEW DOCTOR, ENCOUNTER FOR: Primary | ICD-10-CM

## 2023-03-09 DIAGNOSIS — E66.9 OBESITY (BMI 30-39.9): ICD-10-CM

## 2023-03-09 DIAGNOSIS — Z12.31 SCREENING MAMMOGRAM, ENCOUNTER FOR: ICD-10-CM

## 2023-03-09 NOTE — PROGRESS NOTES
Identified pt with two pt identifiers(name and ). Reviewed record in preparation for visit and have obtained necessary documentation. Chief Complaint   Patient presents with    Facial Pain        Health Maintenance Due   Topic    Depression Screen     COVID-19 Vaccine (1)    DTaP/Tdap/Td series (1 - Tdap)    Colorectal Cancer Screening Combo     Shingles Vaccine (1 of 2)    Pneumococcal 65+ years (1 - PCV)    Breast Cancer Screen Mammogram     Flu Vaccine (1)    Medicare Yearly Exam        Visit Vitals  BP (!) 145/82   Pulse 88   Temp 98.2 °F (36.8 °C) (Oral)   Ht 5' 2\" (1.575 m)   Wt 182 lb 6.4 oz (82.7 kg)   SpO2 98%   BMI 33.36 kg/m²         Coordination of Care Questionnaire:  :   1) Have you been to an emergency room, urgent care, or hospitalized since your last visit? If yes, where when, and reason for visit? no       2. Have seen or consulted any other health care provider since your last visit? If yes, where when, and reason for visit? NO        Patient is accompanied by self I have received verbal consent from Cally Leary to discuss any/all medical information while they are present in the room.

## 2023-03-09 NOTE — PROGRESS NOTES
Rosario Gonzales is an 81 y.o. female who presents to [facility] care   Patient was previously receiving care at: [facility]. Last seen greater than x3 years ago  Medical history significant for: seasonal allergies    Patient reports right sided tightness, pressure to the right ear and face intermittent over the last 2-3 days. In the past the patient notes similar symptoms with associated tightness sensation with smiling or feeling like her eyelids are unable to close. She has had a normal ophthalmologic examination recent for these same symptoms. She denies vision changes, numbness, weakness, dizziness, hearing changes, imbalance, fever, chills, sore throat, CP, SOB or other symptoms    Patient takes her blood pressures at home daily with recent systolic blood pressures of 116 yest and 124 today. Medications: children's benadryl prn for seasonal allergies    Fhx: aunt - cancer, uncertain type; father - heart disease; aunt Bell's palsy    PSHx  - tubal ligation, tonsillectomy    Shx  - tobacco: none  - alcohol: social, occassional glass of wine  - no drug use    Gyn Care  Last mammogram 2012  Unsure of last pap, likely 2012    Review of Systems   General/Constitutional:   No headache, fever, fatigue, weight loss or weight gain       Eyes:   No redness, pruritis, pain, visual changes, swelling, or discharge      Ears:    No pain, loss or changes in hearing.  Reports tightness, itching to right external ear  Neck:   No swelling, masses, stiffness, pain, or limited movement     Cardiac:    No chest pain      Respiratory:   No cough or shortness of breath     GI:   No nausea/vomiting, diarrhea, abdominal pain, bloody or dark stools       :   No dysuria or  hematuria    Neurological:   No loss of consciousness, dizziness, seizures, dysarthria, cognitive changes, memory changes,  problems with balance, or unilateral weakness     Skin: No rash     Current Medications  Current medications include:   Current Outpatient Medications   Medication Sig    calcium-vitamin D (OSCAL 250) 250-125 mg-unit tablet Take 1 Tab by mouth daily. chlorpheniramine-HYDROcodone (TUSSIONEX PENNKINETIC ER) 8-10 mg/5 mL suspension Take 5 mL by mouth every twelve (12) hours as needed for Cough. albuterol (PROAIR HFA) 90 mcg/actuation inhaler Take 2 puffs by inhalation every four (4) hours as needed for Wheezing. diphenhydrAMINE (BENADRYL) 25 mg capsule Take 25 mg by mouth every six (6) hours as needed. 12.5 mg      No current facility-administered medications for this visit.        Allergies  Allergies   Allergen Reactions    Amoxicillin Hives    Erythromycin Rash    Pcn [Penicillins] Rash     States has allergy to Amoxicillin     Sulfa (Sulfonamide Antibiotics) Hives       Past Medical History  Past Medical History:   Diagnosis Date    Seasonal allergic rhinitis 5/3/2010       Past Surgical History   Past Surgical History:   Procedure Laterality Date    HX TONSILLECTOMY  11yo    HX TUBAL LIGATION         Family History  Family History   Problem Relation Age of Onset    Heart Disease Mother          71yo    Heart Attack Father          52yo    Breast Cancer Maternal Aunt        Social History  Social History     Socioeconomic History    Marital status:      Spouse name: Not on file    Number of children: Not on file    Years of education: Not on file    Highest education level: Not on file   Occupational History    Not on file   Tobacco Use    Smoking status: Never    Smokeless tobacco: Never   Substance and Sexual Activity    Alcohol use: No    Drug use: No    Sexual activity: Yes     Partners: Male     Birth control/protection: None   Other Topics Concern    Not on file   Social History Narrative    Not on file     Social Determinants of Health     Financial Resource Strain: Not on file   Food Insecurity: Not on file   Transportation Needs: Not on file   Physical Activity: Not on file   Stress: Not on file   Social Connections: Not on file   Intimate Partner Violence: Not on file   Housing Stability: Not on file       Immunizations    There is no immunization history on file for this patient. Health Maintenance  DEXA scan : 2018    Objective   Vital Signs  Visit Vitals  BP (!) 146/79 (BP 1 Location: Left upper arm, BP Patient Position: Sitting)   Pulse 88   Temp 98.2 °F (36.8 °C) (Oral)   Ht 5' 2\" (1.575 m)   Wt 182 lb 6.4 oz (82.7 kg)   SpO2 98%   BMI 33.36 kg/m²       Physical Examination  GEN: No apparent distress. Alert and oriented and responds to all questions appropriately. EYES:  Conjunctiva clear; pupils round and reactive to light; extraocular movements areintact. EAR: External ears are normal.  Tympanic membranes are clear and without effusion. NOSE: Turbinates are within normal limits. No drainage  OROPHYARYNX: No oral lesions or exudates. NECK:  Supple; no masses; thyroid normal           LUNGS: Respirations unlabored; clear to auscultation bilaterally  CARDIOVASCULAR: Regular, rate, and rhythm without murmurs, gallops or rubs   ABDOMEN: Soft; nontender; nondistended; normoactive bowel sounds; no masses or organomegaly  NEUROLOGIC:  No focal neurologic deficits. Strength and sensation grossly intact. Coordination and gait grossly intact. CNII-XII intact. Normal FNF. Negative pronator drift. EXT: Well perfused. No edema. SKIN: No obvious rashes. Assessment:   Ramesh Paco is a 81 y.o. here to establish to care, also c/o right sided facial tightness. Plan:   Establishing care with new doctor, encounter for:   - LIPID PANEL; Future  - METABOLIC PANEL, BASIC; Future  - CBC W/O DIFF; Future  - CBC W/O DIFF  - METABOLIC PANEL, BASIC  - LIPID PANEL    Screening mammogram, encounter for: patient due for routine breast cancer screening.  - [facility] MAMMO BI SCREENING INCL CAD; Future    Obesity (BMI 30-39. 9):   - LIPID PANEL; Future  - METABOLIC PANEL, BASIC; Future  - CBC W/O DIFF;  Future  - CBC W/O DIFF  - METABOLIC PANEL, BASIC  - LIPID PANEL    5. Right trigeminal neuralgia: intermittent right facial tightness sensation. Symptoms, distribution consistent with right trigeminal nerve involvement. Differential includes trigeminal neuralgia, facial nerve palsy, stroke, ear infection, parotitis, herpes zoster virus. Physical exam unremarkable with CN intact. - advised patient to monitor symptoms and return if symptoms persist or do not improve. I discussed the aforementioned diagnoses with the patient as well as the plan of care. All questions were answered and an AVS was provided.      I discussed this patient with Dr. Christine Farmer (Attending Physician)      Signed By:  Rhina Villa MD

## 2023-03-10 LAB
ANION GAP SERPL CALC-SCNC: 8 MMOL/L (ref 5–15)
BUN SERPL-MCNC: 19 MG/DL (ref 6–20)
BUN/CREAT SERPL: 21 (ref 12–20)
CALCIUM SERPL-MCNC: 10 MG/DL (ref 8.5–10.1)
CHLORIDE SERPL-SCNC: 105 MMOL/L (ref 97–108)
CHOLEST SERPL-MCNC: 221 MG/DL
CO2 SERPL-SCNC: 28 MMOL/L (ref 21–32)
CREAT SERPL-MCNC: 0.89 MG/DL (ref 0.55–1.02)
ERYTHROCYTE [DISTWIDTH] IN BLOOD BY AUTOMATED COUNT: 12.4 % (ref 11.5–14.5)
GLUCOSE SERPL-MCNC: 107 MG/DL (ref 65–100)
HCT VFR BLD AUTO: 47.3 % (ref 35–47)
HDLC SERPL-MCNC: 68 MG/DL
HDLC SERPL: 3.3 (ref 0–5)
HGB BLD-MCNC: 14.7 G/DL (ref 11.5–16)
LDLC SERPL CALC-MCNC: 123.8 MG/DL (ref 0–100)
MCH RBC QN AUTO: 30.3 PG (ref 26–34)
MCHC RBC AUTO-ENTMCNC: 31.1 G/DL (ref 30–36.5)
MCV RBC AUTO: 97.5 FL (ref 80–99)
NRBC # BLD: 0 K/UL (ref 0–0.01)
NRBC BLD-RTO: 0 PER 100 WBC
PLATELET # BLD AUTO: 271 K/UL (ref 150–400)
PMV BLD AUTO: 11.6 FL (ref 8.9–12.9)
POTASSIUM SERPL-SCNC: 4.1 MMOL/L (ref 3.5–5.1)
RBC # BLD AUTO: 4.85 M/UL (ref 3.8–5.2)
SODIUM SERPL-SCNC: 141 MMOL/L (ref 136–145)
TRIGL SERPL-MCNC: 146 MG/DL (ref ?–150)
VLDLC SERPL CALC-MCNC: 29.2 MG/DL
WBC # BLD AUTO: 16.8 K/UL (ref 3.6–11)